# Patient Record
Sex: MALE | Race: BLACK OR AFRICAN AMERICAN | NOT HISPANIC OR LATINO | Employment: OTHER | URBAN - METROPOLITAN AREA
[De-identification: names, ages, dates, MRNs, and addresses within clinical notes are randomized per-mention and may not be internally consistent; named-entity substitution may affect disease eponyms.]

---

## 2017-08-31 LAB — CRC RECOMMENDATION EXT: NORMAL

## 2017-11-13 ENCOUNTER — HISTORICAL (OUTPATIENT)
Dept: LAB | Facility: HOSPITAL | Age: 57
End: 2017-11-13

## 2017-11-13 LAB
ABS NEUT (OLG): 3.62 X10(3)/MCL (ref 2.1–9.2)
ALBUMIN SERPL-MCNC: 3.9 GM/DL (ref 3.4–5)
ALBUMIN/GLOB SERPL: 1.3 RATIO (ref 1.1–2)
ALP SERPL-CCNC: 101 UNIT/L (ref 50–136)
ALT SERPL-CCNC: 28 UNIT/L (ref 12–78)
AST SERPL-CCNC: 18 UNIT/L (ref 15–37)
BASOPHILS # BLD AUTO: 0.1 X10(3)/MCL (ref 0–0.2)
BASOPHILS NFR BLD AUTO: 2 %
BILIRUB SERPL-MCNC: 0.5 MG/DL (ref 0.2–1)
BILIRUBIN DIRECT+TOT PNL SERPL-MCNC: 0.2 MG/DL (ref 0–0.5)
BILIRUBIN DIRECT+TOT PNL SERPL-MCNC: 0.3 MG/DL (ref 0–0.8)
BUN SERPL-MCNC: 11 MG/DL (ref 7–18)
CALCIUM SERPL-MCNC: 9.1 MG/DL (ref 8.5–10.1)
CHLORIDE SERPL-SCNC: 106 MMOL/L (ref 98–107)
CHOLEST SERPL-MCNC: 180 MG/DL (ref 0–200)
CHOLEST/HDLC SERPL: 2.7 {RATIO} (ref 0–5)
CO2 SERPL-SCNC: 24 MMOL/L (ref 21–32)
CREAT SERPL-MCNC: 1.05 MG/DL (ref 0.7–1.3)
DEPRECATED CALCIDIOL+CALCIFEROL SERPL-MC: 24.3 NG/ML (ref 30–80)
EOSINOPHIL # BLD AUTO: 0.1 X10(3)/MCL (ref 0–0.9)
EOSINOPHIL NFR BLD AUTO: 2 %
ERYTHROCYTE [DISTWIDTH] IN BLOOD BY AUTOMATED COUNT: 12.9 % (ref 11.5–17)
GLOBULIN SER-MCNC: 3 GM/DL (ref 2.4–3.5)
GLUCOSE SERPL-MCNC: 119 MG/DL (ref 74–106)
HCT VFR BLD AUTO: 39.1 % (ref 42–52)
HDLC SERPL-MCNC: 67 MG/DL (ref 35–60)
HGB BLD-MCNC: 13.7 GM/DL (ref 14–18)
LDLC SERPL CALC-MCNC: 97 MG/DL (ref 0–129)
LYMPHOCYTES # BLD AUTO: 1.7 X10(3)/MCL (ref 0.6–4.6)
LYMPHOCYTES NFR BLD AUTO: 28 %
MCH RBC QN AUTO: 31.5 PG (ref 27–31)
MCHC RBC AUTO-ENTMCNC: 35 GM/DL (ref 33–36)
MCV RBC AUTO: 89.9 FL (ref 80–94)
MONOCYTES # BLD AUTO: 0.4 X10(3)/MCL (ref 0.1–1.3)
MONOCYTES NFR BLD AUTO: 7 %
NEUTROPHILS # BLD AUTO: 3.62 X10(3)/MCL (ref 1.4–7.9)
NEUTROPHILS NFR BLD AUTO: 61 %
PLATELET # BLD AUTO: 188 X10(3)/MCL (ref 130–400)
PMV BLD AUTO: 10 FL (ref 9.4–12.4)
POTASSIUM SERPL-SCNC: 3.8 MMOL/L (ref 3.5–5.1)
PROT SERPL-MCNC: 6.9 GM/DL (ref 6.4–8.2)
PSA SERPL-MCNC: 0.69 NG/ML (ref 0–4)
RBC # BLD AUTO: 4.35 X10(6)/MCL (ref 4.7–6.1)
SODIUM SERPL-SCNC: 141 MMOL/L (ref 136–145)
TRIGL SERPL-MCNC: 80 MG/DL (ref 30–150)
TSH SERPL-ACNC: 0.39 MIU/ML (ref 0.36–3.74)
VLDLC SERPL CALC-MCNC: 16 MG/DL
WBC # SPEC AUTO: 5.9 X10(3)/MCL (ref 4.5–11.5)

## 2020-12-30 ENCOUNTER — HISTORICAL (OUTPATIENT)
Dept: LAB | Facility: HOSPITAL | Age: 60
End: 2020-12-30

## 2020-12-30 LAB
ABS NEUT (OLG): 3.06 X10(3)/MCL (ref 2.1–9.2)
ALBUMIN SERPL-MCNC: 4.2 GM/DL (ref 3.5–5)
ALBUMIN/GLOB SERPL: 1.4 RATIO (ref 1.1–2)
ALP SERPL-CCNC: 109 UNIT/L (ref 40–150)
ALT SERPL-CCNC: 17 UNIT/L (ref 0–55)
AST SERPL-CCNC: 18 UNIT/L (ref 5–34)
BASOPHILS # BLD AUTO: 0.08 X10(3)/MCL (ref 0–0.2)
BASOPHILS NFR BLD AUTO: 1.4 % (ref 0–0.9)
BILIRUB SERPL-MCNC: 0.5 MG/DL (ref 0.2–1.2)
BILIRUBIN DIRECT+TOT PNL SERPL-MCNC: 0.2 MG/DL (ref 0–0.5)
BILIRUBIN DIRECT+TOT PNL SERPL-MCNC: 0.3 MG/DL (ref 0–0.8)
BUN SERPL-MCNC: 14.1 MG/DL (ref 8.4–25.7)
CALCIUM SERPL-MCNC: 9.4 MG/DL (ref 8.4–10.2)
CHLORIDE SERPL-SCNC: 106 MMOL/L (ref 98–107)
CHOLEST SERPL-MCNC: 217 MG/DL
CHOLEST/HDLC SERPL: 4 {RATIO} (ref 0–5)
CO2 SERPL-SCNC: 26 MMOL/L (ref 22–29)
CREAT SERPL-MCNC: 1.07 MG/DL (ref 0.72–1.25)
DEPRECATED CALCIDIOL+CALCIFEROL SERPL-MC: 58.8 NG/ML (ref 30–80)
EOSINOPHIL # BLD AUTO: 0.08 X10(3)/MCL (ref 0–0.9)
EOSINOPHIL NFR BLD AUTO: 1.4 % (ref 0–6.5)
ERYTHROCYTE [DISTWIDTH] IN BLOOD BY AUTOMATED COUNT: 12.7 % (ref 11.5–17)
EST. AVERAGE GLUCOSE BLD GHB EST-MCNC: 114 MG/DL
FOLATE SERPL-MCNC: 11.8 NG/ML (ref 7–31.4)
GLOBULIN SER-MCNC: 2.9 GM/DL (ref 2.4–3.5)
GLUCOSE SERPL-MCNC: 88 MG/DL (ref 74–100)
HBA1C MFR BLD: 5.6 %
HCT VFR BLD AUTO: 41.1 % (ref 42–52)
HDLC SERPL-MCNC: 60 MG/DL (ref 40–60)
HGB BLD-MCNC: 14 GM/DL (ref 14–18)
IMM GRANULOCYTES # BLD AUTO: 0.01 10*3/UL (ref 0–0.02)
IMM GRANULOCYTES NFR BLD AUTO: 0.2 % (ref 0–0.43)
LDLC SERPL CALC-MCNC: 143 MG/DL (ref 50–140)
LYMPHOCYTES # BLD AUTO: 1.95 X10(3)/MCL (ref 0.6–4.6)
LYMPHOCYTES NFR BLD AUTO: 34.9 % (ref 16.2–38.3)
MCH RBC QN AUTO: 30.9 PG (ref 27–31)
MCHC RBC AUTO-ENTMCNC: 34.1 GM/DL (ref 33–36)
MCV RBC AUTO: 90.7 FL (ref 80–94)
MONOCYTES # BLD AUTO: 0.4 X10(3)/MCL (ref 0.1–1.3)
MONOCYTES NFR BLD AUTO: 7.2 % (ref 4.7–11.3)
NEUTROPHILS # BLD AUTO: 3.06 X10(3)/MCL (ref 2.1–9.2)
NEUTROPHILS NFR BLD AUTO: 54.9 % (ref 49.1–73.4)
NRBC BLD AUTO-RTO: 0 % (ref 0–0.2)
PLATELET # BLD AUTO: 200 X10(3)/MCL (ref 130–400)
PMV BLD AUTO: 9.3 FL (ref 7.4–10.4)
POTASSIUM SERPL-SCNC: 4.2 MMOL/L (ref 3.5–5.1)
PROT SERPL-MCNC: 7.1 GM/DL (ref 6.4–8.3)
PSA SERPL-MCNC: 0.69 NG/ML
RBC # BLD AUTO: 4.53 X10(6)/MCL (ref 4.7–6.1)
SODIUM SERPL-SCNC: 142 MMOL/L (ref 136–145)
TRIGL SERPL-MCNC: 68 MG/DL (ref 0–150)
TSH SERPL-ACNC: 0.63 UIU/ML (ref 0.35–4.94)
VIT B12 SERPL-MCNC: 361 PG/ML (ref 213–816)
VLDLC SERPL CALC-MCNC: 14 MG/DL
WBC # SPEC AUTO: 5.6 X10(3)/MCL (ref 4.5–11.5)

## 2022-04-10 ENCOUNTER — HISTORICAL (OUTPATIENT)
Dept: ADMINISTRATIVE | Facility: HOSPITAL | Age: 62
End: 2022-04-10

## 2022-04-27 VITALS
OXYGEN SATURATION: 99 % | DIASTOLIC BLOOD PRESSURE: 78 MMHG | SYSTOLIC BLOOD PRESSURE: 118 MMHG | HEIGHT: 72 IN | WEIGHT: 233.69 LBS | BODY MASS INDEX: 31.65 KG/M2

## 2022-09-25 PROBLEM — E66.9 OBESITY: Status: ACTIVE | Noted: 2022-09-25

## 2022-09-25 PROBLEM — E55.9 VITAMIN D DEFICIENCY: Status: ACTIVE | Noted: 2022-09-25

## 2022-09-25 PROBLEM — Z28.21 TETANUS, DIPHTHERIA, AND ACELLULAR PERTUSSIS (TDAP) VACCINATION DECLINED: Status: ACTIVE | Noted: 2022-09-25

## 2022-09-25 PROBLEM — Z83.3 FAMILY HISTORY OF DIABETES MELLITUS: Status: ACTIVE | Noted: 2022-09-25

## 2022-09-25 PROBLEM — Z00.00 WELLNESS EXAMINATION: Status: ACTIVE | Noted: 2022-09-25

## 2022-09-25 PROBLEM — Z12.11 COLON CANCER SCREENING: Status: ACTIVE | Noted: 2022-09-25

## 2022-09-25 NOTE — PROGRESS NOTES
Subjective:        Patient ID: Abraham Kwon is a 62 y.o. male.    Chief Complaint: Annual Exam (Wellness/Patient denies wanting flu shot/Patient needs labs ordered)      Patient presents to the clinic unaccompanied for a wellness visit.  He is due for lab work.  His last visit was in December of 2020.  He does not take any medications on a regular basis.      He has a history of low vitamin-D and was supplementing over-the-counter but has not been lately.    He is obese.    He has had a colonoscopy with Dr. Narvaez in 8/2017 which recommended a repeat in 10 years.    She there is a positive family history of diabetes in a sister and a paternal grandmother.    He is not allergic to any medications.  He does not drink alcohol or smoke.  He declines immunizations today.    Review of Systems   Constitutional: Negative.    HENT: Negative.     Eyes: Negative.    Respiratory: Negative.     Cardiovascular: Negative.    Gastrointestinal: Negative.    Endocrine: Negative.    Genitourinary: Negative.    Musculoskeletal: Negative.    Skin: Negative.    Allergic/Immunologic: Negative.    Neurological: Negative.    Hematological: Negative.    Psychiatric/Behavioral: Negative.     All other systems reviewed and are negative.      Review of patient's allergies indicates:  No Known Allergies   Vitals:    09/26/22 1008   BP: 124/84   Pulse: 71   Resp: 16   Temp: 98.1 °F (36.7 °C)   SpO2: 98%   Weight: 100.6 kg (221 lb 12.8 oz)      Social History     Socioeconomic History    Marital status: Single   Tobacco Use    Smoking status: Never    Smokeless tobacco: Never      History reviewed. No pertinent family history.       Objective:     Physical Exam  Vitals and nursing note reviewed.   Constitutional:       Appearance: Normal appearance. He is obese.   HENT:      Head: Normocephalic.      Nose: Nose normal.      Mouth/Throat:      Mouth: Mucous membranes are moist.      Pharynx: Oropharynx is clear.   Eyes:      Extraocular Movements:  Extraocular movements intact.   Cardiovascular:      Rate and Rhythm: Normal rate and regular rhythm.   Pulmonary:      Effort: Pulmonary effort is normal.      Breath sounds: Normal breath sounds.   Musculoskeletal:         General: Normal range of motion.   Skin:     General: Skin is warm and dry.   Neurological:      General: No focal deficit present.      Mental Status: He is alert and oriented to person, place, and time. Mental status is at baseline.   Psychiatric:         Mood and Affect: Mood normal.     No current outpatient medications on file prior to visit.     No current facility-administered medications on file prior to visit.     Health Maintenance   Topic Date Due    Hepatitis C Screening  Never done    TETANUS VACCINE  Never done    Lipid Panel  12/30/2025      Results for orders placed or performed in visit on 12/30/20   Comprehensive Metabolic Panel   Result Value Ref Range    Alanine Aminotransferase 17 0 - 55 unit/L    Albumin/Globulin Ratio 1.4 1.1 - 2.0 ratio    Alkaline Phosphatase 109 40 - 150 unit/L    Albumin Level 4.2 3.5 - 5.0 gm/dL    Aspartate Aminotransferase 18 5 - 34 unit/L    Blood Urea Nitrogen 14.1 8.4 - 25.7 mg/dL    Bilirubin Indirect 0.30 0.00 - 0.80 mg/dL    Bilirubin Direct 0.2 0.0 - 0.5 mg/dL    Bilirubin Total 0.5 0.2 - 1.2 mg/dL    Calcium Level Total 9.4 8.4 - 10.2 mg/dL    Chloride 106 98 - 107 mmol/L    Carbon Dioxide 26 22 - 29 mmol/L    Creatinine 1.07 0.72 - 1.25 mg/dL    Glucose Level 88 74 - 100 mg/dL    Globulin 2.9 2.4 - 3.5 gm/dL    Potassium Level 4.2 3.5 - 5.1 mmol/L    Sodium Level 142 136 - 145 mmol/L    Protein Total 7.1 6.4 - 8.3 gm/dL   Lipid Panel   Result Value Ref Range    Cholesterol/HDL Ratio 4 0 - 5    Cholesterol Total 217 (H) <<=200 mg/dL    HDL Cholesterol 60 40 - 60 mg/dL    LDL Cholesterol 143.00 (H) 50.00 - 140.00 mg/dL    Triglyceride 68 0 - 150 mg/dL    Very Low Density Lipoprotein 14    Hemoglobin A1C   Result Value Ref Range    Estimated  Average Glucose 114.0 mg/dL    Hemoglobin A1c 5.6 <<=7.0 %   Folate   Result Value Ref Range    Folate Level 11.8 7.0 - 31.4 ng/mL   PSA, Total (Diagnostic)   Result Value Ref Range    Prostate Specific Antigen 0.69 <<=4.00 ng/mL   TSH   Result Value Ref Range    Thyroid Stimulating Hormone 0.6311 0.3500 - 4.9400 uIU/mL   Vitamin D   Result Value Ref Range    Vit D 25 OH 58.8 30.0 - 80.0 ng/mL   Vitamin B12   Result Value Ref Range    Vitamin B12 Level 361 213 - 816 pg/mL   GFR, Estimated   Result Value Ref Range    Estimated GFR-Non African American >60 mL/min/1.73 m2    Estimated GFR- >60    Differential   Result Value Ref Range    Neut # 3.06 2.10 - 9.20 x10(3)/mcL    Lymph # 1.95 0.60 - 4.60 x10(3)/mcL    Mono # 0.40 0.10 - 1.30 x10(3)/mcL    Eos # 0.08 0.00 - 0.90 x10(3)/mcL    Baso # 0.08 0.00 - 0.20 x10(3)/mcL    Basophil % 1.4 (H) 0.0 - 0.9 %    Neut % 54.9 49.1 - 73.4 %    IG# 0.0100 0.0000 - 0.0155    Lymph % 34.9 16.2 - 38.3 %    IG% 0.200 0.000 - 0.430 %    Mono % 7.2 4.7 - 11.3 %    NRBC% 0.0 0.0 - 0.2 %    Eos % 1.4 0.0 - 6.5 %   CBC Auto Differential   Result Value Ref Range    Abs Neut 3.06 2.10 - 9.20 x10(3)/mcL    MCV 90.7 80.0 - 94.0 fL    MPV 9.3 7.4 - 10.4 fL    MCH 30.9 27.0 - 31.0 pg    MCHC 34.1 33.0 - 36.0 gm/dL    Hgb 14.0 14.0 - 18.0 gm/dL    Hct 41.1 (L) 42.0 - 52.0 %    WBC 5.6 4.5 - 11.5 x10(3)/mcL    RBC 4.53 (L) 4.70 - 6.10 x10(6)/mcL    RDW 12.7 11.5 - 17.0 %    Platelet 200 130 - 400 x10(3)/mcL          Assessment & Plan:     Active Problem List with Overview Notes    Diagnosis Date Noted    Prostate cancer screening 09/26/2022    Wellness examination 09/25/2022    Obesity 09/25/2022    Tetanus, diphtheria, and acellular pertussis (Tdap) vaccination declined 09/25/2022    Vitamin D deficiency 09/25/2022    Family history of diabetes mellitus 09/25/2022    Colon cancer screening 09/25/2022       1. Wellness examination  Assessment & Plan:  Fasting labs ordered. Will  call with results when available  psa ordered  Colonoscopy 8/2017 with dr. coleman  Declines immunizations      Orders:  -     CBC Auto Differential  -     Comprehensive Metabolic Panel  -     Hemoglobin A1C  -     Hepatitis C Antibody  -     HIV 1/2 Ag/Ab (4th Gen)  -     Vitamin D  -     TSH  -     PSA, Screening  -     Lipid Panel    2. Colon cancer screening  Assessment & Plan:  Colonoscopy with dr. coleman 8/2017    Orders:  -     CBC Auto Differential  -     Comprehensive Metabolic Panel  -     Hemoglobin A1C  -     Hepatitis C Antibody  -     HIV 1/2 Ag/Ab (4th Gen)  -     Vitamin D  -     TSH  -     PSA, Screening  -     Lipid Panel    3. Vitamin D deficiency  Assessment & Plan:  Not currently supplementing    Orders:  -     CBC Auto Differential  -     Comprehensive Metabolic Panel  -     Hemoglobin A1C  -     Hepatitis C Antibody  -     HIV 1/2 Ag/Ab (4th Gen)  -     Vitamin D  -     TSH  -     PSA, Screening  -     Lipid Panel    4. Obesity, unspecified classification, unspecified obesity type, unspecified whether serious comorbidity present  Assessment & Plan:  Encourage lifestyle change    Orders:  -     CBC Auto Differential  -     Comprehensive Metabolic Panel  -     Hemoglobin A1C  -     Hepatitis C Antibody  -     HIV 1/2 Ag/Ab (4th Gen)  -     Vitamin D  -     TSH  -     PSA, Screening  -     Lipid Panel    5. Family history of diabetes mellitus  Assessment & Plan:  Will add a1c to wellness labs.    Orders:  -     CBC Auto Differential  -     Comprehensive Metabolic Panel  -     Hemoglobin A1C  -     Hepatitis C Antibody  -     HIV 1/2 Ag/Ab (4th Gen)  -     Vitamin D  -     TSH  -     PSA, Screening  -     Lipid Panel    6. Prostate cancer screening  Assessment & Plan:  psa ordered    Orders:  -     CBC Auto Differential  -     Comprehensive Metabolic Panel  -     Hemoglobin A1C  -     Hepatitis C Antibody  -     HIV 1/2 Ag/Ab (4th Gen)  -     Vitamin D  -     TSH  -     PSA, Screening  -      Lipid Panel    7. Tetanus, diphtheria, and acellular pertussis (Tdap) vaccination declined  Assessment & Plan:  Declines immunizations         Follow up in about 1 year (around 9/26/2023) for wellness with labs.

## 2022-09-25 NOTE — ASSESSMENT & PLAN NOTE
Fasting labs ordered. Will call with results when available  psa ordered  Colonoscopy 8/2017 with dr. coleman  Declines immunizations

## 2022-09-26 ENCOUNTER — OFFICE VISIT (OUTPATIENT)
Dept: FAMILY MEDICINE | Facility: CLINIC | Age: 62
End: 2022-09-26
Payer: COMMERCIAL

## 2022-09-26 VITALS
WEIGHT: 221.81 LBS | TEMPERATURE: 98 F | BODY MASS INDEX: 29.72 KG/M2 | SYSTOLIC BLOOD PRESSURE: 124 MMHG | RESPIRATION RATE: 16 BRPM | OXYGEN SATURATION: 98 % | HEART RATE: 71 BPM | DIASTOLIC BLOOD PRESSURE: 84 MMHG

## 2022-09-26 DIAGNOSIS — Z28.21 TETANUS, DIPHTHERIA, AND ACELLULAR PERTUSSIS (TDAP) VACCINATION DECLINED: ICD-10-CM

## 2022-09-26 DIAGNOSIS — E55.9 VITAMIN D DEFICIENCY: ICD-10-CM

## 2022-09-26 DIAGNOSIS — Z83.3 FAMILY HISTORY OF DIABETES MELLITUS: ICD-10-CM

## 2022-09-26 DIAGNOSIS — E66.9 OBESITY, UNSPECIFIED CLASSIFICATION, UNSPECIFIED OBESITY TYPE, UNSPECIFIED WHETHER SERIOUS COMORBIDITY PRESENT: ICD-10-CM

## 2022-09-26 DIAGNOSIS — Z00.00 WELLNESS EXAMINATION: Primary | ICD-10-CM

## 2022-09-26 DIAGNOSIS — Z12.5 PROSTATE CANCER SCREENING: ICD-10-CM

## 2022-09-26 DIAGNOSIS — Z12.11 COLON CANCER SCREENING: ICD-10-CM

## 2022-09-26 PROCEDURE — 3079F DIAST BP 80-89 MM HG: CPT | Mod: CPTII,,, | Performed by: FAMILY MEDICINE

## 2022-09-26 PROCEDURE — 3008F BODY MASS INDEX DOCD: CPT | Mod: CPTII,,, | Performed by: FAMILY MEDICINE

## 2022-09-26 PROCEDURE — 3008F PR BODY MASS INDEX (BMI) DOCUMENTED: ICD-10-PCS | Mod: CPTII,,, | Performed by: FAMILY MEDICINE

## 2022-09-26 PROCEDURE — 1159F PR MEDICATION LIST DOCUMENTED IN MEDICAL RECORD: ICD-10-PCS | Mod: CPTII,,, | Performed by: FAMILY MEDICINE

## 2022-09-26 PROCEDURE — 1159F MED LIST DOCD IN RCRD: CPT | Mod: CPTII,,, | Performed by: FAMILY MEDICINE

## 2022-09-26 PROCEDURE — 3074F PR MOST RECENT SYSTOLIC BLOOD PRESSURE < 130 MM HG: ICD-10-PCS | Mod: CPTII,,, | Performed by: FAMILY MEDICINE

## 2022-09-26 PROCEDURE — 1160F RVW MEDS BY RX/DR IN RCRD: CPT | Mod: CPTII,,, | Performed by: FAMILY MEDICINE

## 2022-09-26 PROCEDURE — 99396 PR PREVENTIVE VISIT,EST,40-64: ICD-10-PCS | Mod: ,,, | Performed by: FAMILY MEDICINE

## 2022-09-26 PROCEDURE — 1160F PR REVIEW ALL MEDS BY PRESCRIBER/CLIN PHARMACIST DOCUMENTED: ICD-10-PCS | Mod: CPTII,,, | Performed by: FAMILY MEDICINE

## 2022-09-26 PROCEDURE — 3074F SYST BP LT 130 MM HG: CPT | Mod: CPTII,,, | Performed by: FAMILY MEDICINE

## 2022-09-26 PROCEDURE — 99396 PREV VISIT EST AGE 40-64: CPT | Mod: ,,, | Performed by: FAMILY MEDICINE

## 2022-09-26 PROCEDURE — 3079F PR MOST RECENT DIASTOLIC BLOOD PRESSURE 80-89 MM HG: ICD-10-PCS | Mod: CPTII,,, | Performed by: FAMILY MEDICINE

## 2022-09-30 ENCOUNTER — LAB VISIT (OUTPATIENT)
Dept: LAB | Facility: HOSPITAL | Age: 62
End: 2022-09-30
Attending: FAMILY MEDICINE
Payer: COMMERCIAL

## 2022-09-30 DIAGNOSIS — E55.9 VITAMIN D DEFICIENCY: ICD-10-CM

## 2022-09-30 DIAGNOSIS — E66.9 OBESITY, UNSPECIFIED CLASSIFICATION, UNSPECIFIED OBESITY TYPE, UNSPECIFIED WHETHER SERIOUS COMORBIDITY PRESENT: ICD-10-CM

## 2022-09-30 DIAGNOSIS — Z83.3 FAMILY HISTORY OF DIABETES MELLITUS: ICD-10-CM

## 2022-09-30 DIAGNOSIS — Z00.00 WELLNESS EXAMINATION: ICD-10-CM

## 2022-09-30 DIAGNOSIS — Z12.5 PROSTATE CANCER SCREENING: ICD-10-CM

## 2022-09-30 DIAGNOSIS — Z12.11 COLON CANCER SCREENING: ICD-10-CM

## 2022-09-30 LAB
ALBUMIN SERPL-MCNC: 3.7 GM/DL (ref 3.4–4.8)
ALBUMIN/GLOB SERPL: 1.1 RATIO (ref 1.1–2)
ALP SERPL-CCNC: 103 UNIT/L (ref 40–150)
ALT SERPL-CCNC: 16 UNIT/L (ref 0–55)
AST SERPL-CCNC: 18 UNIT/L (ref 5–34)
BASOPHILS # BLD AUTO: 0.1 X10(3)/MCL (ref 0–0.2)
BASOPHILS NFR BLD AUTO: 1.4 %
BILIRUBIN DIRECT+TOT PNL SERPL-MCNC: 0.6 MG/DL
BUN SERPL-MCNC: 7 MG/DL (ref 8.4–25.7)
CALCIUM SERPL-MCNC: 9.7 MG/DL (ref 8.8–10)
CHLORIDE SERPL-SCNC: 106 MMOL/L (ref 98–107)
CHOLEST SERPL-MCNC: 173 MG/DL
CHOLEST/HDLC SERPL: 3 {RATIO} (ref 0–5)
CO2 SERPL-SCNC: 27 MMOL/L (ref 23–31)
CREAT SERPL-MCNC: 0.91 MG/DL (ref 0.73–1.18)
DEPRECATED CALCIDIOL+CALCIFEROL SERPL-MC: 31.8 NG/ML (ref 30–80)
EOSINOPHIL # BLD AUTO: 0.15 X10(3)/MCL (ref 0–0.9)
EOSINOPHIL NFR BLD AUTO: 2 %
ERYTHROCYTE [DISTWIDTH] IN BLOOD BY AUTOMATED COUNT: 12.6 % (ref 11.5–17)
EST. AVERAGE GLUCOSE BLD GHB EST-MCNC: 108.3 MG/DL
GFR SERPLBLD CREATININE-BSD FMLA CKD-EPI: >60 MLS/MIN/1.73/M2
GLOBULIN SER-MCNC: 3.4 GM/DL (ref 2.4–3.5)
GLUCOSE SERPL-MCNC: 94 MG/DL (ref 82–115)
HBA1C MFR BLD: 5.4 %
HCT VFR BLD AUTO: 39.7 % (ref 42–52)
HCV AB SERPL QL IA: NONREACTIVE
HDLC SERPL-MCNC: 55 MG/DL (ref 35–60)
HGB BLD-MCNC: 13.6 GM/DL (ref 14–18)
HIV 1+2 AB+HIV1 P24 AG SERPL QL IA: NONREACTIVE
IMM GRANULOCYTES # BLD AUTO: 0.02 X10(3)/MCL (ref 0–0.04)
IMM GRANULOCYTES NFR BLD AUTO: 0.3 %
LDLC SERPL CALC-MCNC: 108 MG/DL (ref 50–140)
LYMPHOCYTES # BLD AUTO: 1.82 X10(3)/MCL (ref 0.6–4.6)
LYMPHOCYTES NFR BLD AUTO: 24.8 %
MCH RBC QN AUTO: 31.5 PG (ref 27–31)
MCHC RBC AUTO-ENTMCNC: 34.3 MG/DL (ref 33–36)
MCV RBC AUTO: 91.9 FL (ref 80–94)
MONOCYTES # BLD AUTO: 0.62 X10(3)/MCL (ref 0.1–1.3)
MONOCYTES NFR BLD AUTO: 8.4 %
NEUTROPHILS # BLD AUTO: 4.6 X10(3)/MCL (ref 2.1–9.2)
NEUTROPHILS NFR BLD AUTO: 63.1 %
NRBC BLD AUTO-RTO: 0 %
PLATELET # BLD AUTO: 222 X10(3)/MCL (ref 130–400)
PMV BLD AUTO: 9.2 FL (ref 7.4–10.4)
POTASSIUM SERPL-SCNC: 4.1 MMOL/L (ref 3.5–5.1)
PROT SERPL-MCNC: 7.1 GM/DL (ref 5.8–7.6)
PSA SERPL-MCNC: 0.77 NG/ML
RBC # BLD AUTO: 4.32 X10(6)/MCL (ref 4.7–6.1)
SODIUM SERPL-SCNC: 143 MMOL/L (ref 136–145)
TRIGL SERPL-MCNC: 48 MG/DL (ref 34–140)
TSH SERPL-ACNC: 0.81 UIU/ML (ref 0.35–4.94)
VLDLC SERPL CALC-MCNC: 10 MG/DL
WBC # SPEC AUTO: 7.4 X10(3)/MCL (ref 4.5–11.5)

## 2022-09-30 PROCEDURE — 84153 ASSAY OF PSA TOTAL: CPT

## 2022-09-30 PROCEDURE — 82306 VITAMIN D 25 HYDROXY: CPT

## 2022-09-30 PROCEDURE — 84443 ASSAY THYROID STIM HORMONE: CPT

## 2022-09-30 PROCEDURE — 85025 COMPLETE CBC W/AUTO DIFF WBC: CPT

## 2022-09-30 PROCEDURE — 36415 COLL VENOUS BLD VENIPUNCTURE: CPT

## 2022-09-30 PROCEDURE — 86803 HEPATITIS C AB TEST: CPT

## 2022-09-30 PROCEDURE — 83036 HEMOGLOBIN GLYCOSYLATED A1C: CPT

## 2022-09-30 PROCEDURE — 80061 LIPID PANEL: CPT

## 2022-09-30 PROCEDURE — 87389 HIV-1 AG W/HIV-1&-2 AB AG IA: CPT

## 2022-09-30 PROCEDURE — 80053 COMPREHEN METABOLIC PANEL: CPT

## 2022-09-30 NOTE — PROGRESS NOTES
Please inform patient of results.    1. He is anemic.  Is he seeing any blood in urine or stool? We may need to do more testing to figure out cause of anemia.   2. Vit d is low normal. Is he supplementing otc?    Other labwork within acceptable ranges. Continue on current meds.

## 2022-10-12 ENCOUNTER — DOCUMENTATION ONLY (OUTPATIENT)
Dept: ADMINISTRATIVE | Facility: HOSPITAL | Age: 62
End: 2022-10-12
Payer: COMMERCIAL

## 2022-10-19 ENCOUNTER — TELEPHONE (OUTPATIENT)
Dept: FAMILY MEDICINE | Facility: CLINIC | Age: 62
End: 2022-10-19
Payer: COMMERCIAL

## 2022-10-19 DIAGNOSIS — D64.9 ANEMIA, UNSPECIFIED TYPE: Primary | ICD-10-CM

## 2022-10-19 NOTE — TELEPHONE ENCOUNTER
----- Message from Dario Aceves sent at 10/19/2022  2:37 PM CDT -----  Regarding: call back  Type:  Patient Call Back    Who Called:Abraham  Would the patient rather a call back or a response via [facility]? Call back  Best Call Back Number:[phone]  Additional Information: Pt called and said he still hasn't heard anything about his results and what medications he should be taking. Pls call back pt thanks.

## 2022-10-19 NOTE — TELEPHONE ENCOUNTER
Had previously left voicemail for patient to return call.  I notified him of his lab results. He verbalized understanding.   Patient has not noticed any urine or blood in his stool, and stated he would be okay with doing more lab work to find out the cause of his anemia. Please advise on any orders you'd like to place. Thank you

## 2022-10-20 NOTE — TELEPHONE ENCOUNTER
I have signed for the following orders AND/OR meds.  Please call the patient and ask the patient to schedule the testing AND/OR inform about any medications that were sent.      Orders Placed This Encounter   Procedures    Ferritin     Standing Status:   Future     Standing Expiration Date:   12/19/2023    Folate     Standing Status:   Future     Standing Expiration Date:   12/19/2023    Iron and TIBC     Standing Status:   Future     Standing Expiration Date:   12/19/2023    Vitamin B12     Standing Status:   Future     Standing Expiration Date:   12/19/2023    OCCULT BLOOD FECAL IMMUNOASSAY     Standing Status:   Future     Number of Occurrences:   1     Standing Expiration Date:   12/19/2023    Urinalysis     Standing Status:   Future     Standing Expiration Date:   12/19/2023

## 2022-12-26 PROBLEM — Z00.00 WELLNESS EXAMINATION: Status: RESOLVED | Noted: 2022-09-25 | Resolved: 2022-12-26

## 2025-04-08 NOTE — PROGRESS NOTES
Subjective:        Patient ID: Abraham Kwon is a 65 y.o. male.    Chief Complaint: Annual Exam (Wellness /Swelling bilateral LE that started last year )      Patient presents to the clinic unaccompanied for a wellness visit.  He is due for lab work.  His last visit was 9/2022.  He does not take any medications on a regular basis.     Noted some LE swelling of bilateral since last year. Worse at end of day. No swelling in hands. No sob. No chest pain. Has been eating more salt.      He has low vitamin-D and was supplementing over-the-counter but has not been lately.     He is obese. Gained weight since lov.      He has had a colonoscopy with Dr. Narvaez in 8/2017 which recommended a repeat in 10 years.     She there is a positive family history of diabetes in a sister and a paternal grandmother.     He is not allergic to any medications.  He does not drink alcohol or smoke.  He declines immunizations today.        Review of Systems   Constitutional: Negative.    HENT: Negative.     Eyes: Negative.    Respiratory: Negative.  Negative for cough, shortness of breath and wheezing.    Cardiovascular:  Positive for leg swelling. Negative for chest pain and palpitations.   Gastrointestinal: Negative.    Endocrine: Negative.    Genitourinary: Negative.    Musculoskeletal: Negative.    Skin: Negative.    Allergic/Immunologic: Negative.    Neurological: Negative.    Hematological: Negative.    Psychiatric/Behavioral: Negative.     All other systems reviewed and are negative.        Review of patient's allergies indicates:  No Known Allergies   Vitals:    04/09/25 0948   BP: 124/84   BP Location: Left arm   Patient Position: Sitting   Pulse: 62   Resp: 16   Temp: 98.7 °F (37.1 °C)   TempSrc: Oral   SpO2: 98%   Weight: 104.8 kg (231 lb)   Height: 6' (1.829 m)      Social History     Socioeconomic History    Marital status: Single   Tobacco Use    Smoking status: Never    Smokeless tobacco: Never      No family history on file.        Objective:     Physical Exam  Vitals and nursing note reviewed.   Constitutional:       Appearance: Normal appearance. He is obese.   HENT:      Head: Normocephalic.      Nose: Nose normal.      Mouth/Throat:      Mouth: Mucous membranes are moist.      Pharynx: Oropharynx is clear.   Eyes:      Extraocular Movements: Extraocular movements intact.   Cardiovascular:      Rate and Rhythm: Normal rate and regular rhythm.   Pulmonary:      Effort: Pulmonary effort is normal. No respiratory distress.      Breath sounds: Normal breath sounds. No stridor. No wheezing, rhonchi or rales.   Chest:      Chest wall: No tenderness.   Musculoskeletal:         General: Normal range of motion.      Right lower leg: Swelling present.      Left lower leg: Swelling present.   Skin:     General: Skin is warm and dry.   Neurological:      General: No focal deficit present.      Mental Status: He is alert and oriented to person, place, and time. Mental status is at baseline.   Psychiatric:         Mood and Affect: Mood normal.       Medications Ordered Prior to Encounter[1]  Health Maintenance   Topic Date Due    COVID-19 Vaccine (2 - 2024-25 season) 09/01/2024    TETANUS VACCINE  04/09/2026 (Originally 2/20/1978)    Shingles Vaccine (1 of 2) 04/09/2026 (Originally 2/20/2011)    Pneumococcal Vaccines (Age 50+) (1 of 1 - PCV) 04/09/2026 (Originally 2/20/2011)    Hemoglobin A1c (Diabetic Prevention Screening)  09/30/2025    Colorectal Cancer Screening  08/31/2027    Lipid Panel  09/30/2027    RSV Vaccine (Age 60+ and Pregnant patients) (1 - 1-dose 75+ series) 02/20/2036    Hepatitis C Screening  Completed    HIV Screening  Completed    Influenza Vaccine  Addressed      Results for orders placed or performed in visit on 10/12/22    COLONOSCOPY    Collection Time: 08/31/17  3:09 PM   Result Value Ref Range    CRC Recommendation External Repeat colonoscopy in 10 years           Assessment & Plan:     Active Problem List with Overview  Notes    Diagnosis Date Noted    Localized swelling of both lower extremities 04/09/2025    Prostate cancer screening 09/26/2022    Wellness examination 09/25/2022    Class 1 obesity due to excess calories without serious comorbidity with body mass index (BMI) of 31.0 to 31.9 in adult 09/25/2022    Tetanus, diphtheria, and acellular pertussis (Tdap) vaccination declined 09/25/2022    Vitamin D deficiency 09/25/2022    Family history of diabetes mellitus 09/25/2022    Colon cancer screening 09/25/2022       1. Wellness examination  Assessment & Plan:  Fasting labs ordered. Will call with results when available  psa ordered  Prostate Specific Antigen (ng/mL)   Date Value   09/30/2022 0.77       Colonoscopy 8/2017 with dr. Narvaez with 10 year repeat  Declines immunizations      Orders:  -     CBC Auto Differential; Future; Expected date: 04/09/2025  -     Comprehensive Metabolic Panel; Future; Expected date: 04/09/2025  -     Lipid Panel; Future; Expected date: 04/09/2025  -     TSH; Future; Expected date: 04/09/2025  -     Hemoglobin A1C; Future; Expected date: 04/09/2025  -     Urinalysis; Future; Expected date: 04/09/2025  -     Vitamin D; Future; Expected date: 04/09/2025  -     PSA, Screening; Future; Expected date: 04/09/2025  -     BNP; Future; Expected date: 04/09/2025    2. Vitamin D deficiency  Assessment & Plan:  Not currently supplementing. Lab pending.     Orders:  -     CBC Auto Differential; Future; Expected date: 04/09/2025  -     Comprehensive Metabolic Panel; Future; Expected date: 04/09/2025  -     Lipid Panel; Future; Expected date: 04/09/2025  -     TSH; Future; Expected date: 04/09/2025  -     Hemoglobin A1C; Future; Expected date: 04/09/2025  -     Urinalysis; Future; Expected date: 04/09/2025  -     Vitamin D; Future; Expected date: 04/09/2025  -     PSA, Screening; Future; Expected date: 04/09/2025  -     BNP; Future; Expected date: 04/09/2025    3. Class 1 obesity due to excess calories  without serious comorbidity with body mass index (BMI) of 31.0 to 31.9 in adult  Assessment & Plan:  Gained weight since lov. Encourage lifestyle change. Discussed diet/exercise    Orders:  -     CBC Auto Differential; Future; Expected date: 04/09/2025  -     Comprehensive Metabolic Panel; Future; Expected date: 04/09/2025  -     Lipid Panel; Future; Expected date: 04/09/2025  -     TSH; Future; Expected date: 04/09/2025  -     Hemoglobin A1C; Future; Expected date: 04/09/2025  -     Urinalysis; Future; Expected date: 04/09/2025  -     Vitamin D; Future; Expected date: 04/09/2025  -     PSA, Screening; Future; Expected date: 04/09/2025  -     BNP; Future; Expected date: 04/09/2025    4. Family history of diabetes mellitus  Assessment & Plan:  Will add a1c to wellness labs.    Lab Results   Component Value Date    HGBA1C 5.4 09/30/2022         Orders:  -     CBC Auto Differential; Future; Expected date: 04/09/2025  -     Comprehensive Metabolic Panel; Future; Expected date: 04/09/2025  -     Lipid Panel; Future; Expected date: 04/09/2025  -     TSH; Future; Expected date: 04/09/2025  -     Hemoglobin A1C; Future; Expected date: 04/09/2025  -     Urinalysis; Future; Expected date: 04/09/2025  -     Vitamin D; Future; Expected date: 04/09/2025  -     PSA, Screening; Future; Expected date: 04/09/2025  -     BNP; Future; Expected date: 04/09/2025    5. Colon cancer screening  Assessment & Plan:  Colonoscopy with dr. coleman 8/2017 with 10 year repeat    Orders:  -     CBC Auto Differential; Future; Expected date: 04/09/2025  -     Comprehensive Metabolic Panel; Future; Expected date: 04/09/2025  -     Lipid Panel; Future; Expected date: 04/09/2025  -     TSH; Future; Expected date: 04/09/2025  -     Hemoglobin A1C; Future; Expected date: 04/09/2025  -     Urinalysis; Future; Expected date: 04/09/2025  -     Vitamin D; Future; Expected date: 04/09/2025  -     PSA, Screening; Future; Expected date: 04/09/2025  -     BNP;  Future; Expected date: 04/09/2025    6. Prostate cancer screening  -     CBC Auto Differential; Future; Expected date: 04/09/2025  -     Comprehensive Metabolic Panel; Future; Expected date: 04/09/2025  -     Lipid Panel; Future; Expected date: 04/09/2025  -     TSH; Future; Expected date: 04/09/2025  -     Hemoglobin A1C; Future; Expected date: 04/09/2025  -     Urinalysis; Future; Expected date: 04/09/2025  -     Vitamin D; Future; Expected date: 04/09/2025  -     PSA, Screening; Future; Expected date: 04/09/2025  -     BNP; Future; Expected date: 04/09/2025    7. Localized swelling of both lower extremities  Assessment & Plan:  Likely dependent edema. Labs pending.  Will call with results when available. Avoid salt. Elevate legs when sitting. Wear compression socks. Monitor symptoms. Contact clinic for concerns. Patient is agreeable to plan and verbalized understanding    Orders:  -     CBC Auto Differential; Future; Expected date: 04/09/2025  -     Comprehensive Metabolic Panel; Future; Expected date: 04/09/2025  -     Lipid Panel; Future; Expected date: 04/09/2025  -     TSH; Future; Expected date: 04/09/2025  -     Hemoglobin A1C; Future; Expected date: 04/09/2025  -     Urinalysis; Future; Expected date: 04/09/2025  -     Vitamin D; Future; Expected date: 04/09/2025  -     PSA, Screening; Future; Expected date: 04/09/2025  -     BNP; Future; Expected date: 04/09/2025         Follow up in about 1 year (around 4/9/2026) for Wellness with Labs.          [1]   No current outpatient medications on file prior to visit.     No current facility-administered medications on file prior to visit.

## 2025-04-09 ENCOUNTER — OFFICE VISIT (OUTPATIENT)
Dept: FAMILY MEDICINE | Facility: CLINIC | Age: 65
End: 2025-04-09
Payer: MEDICAID

## 2025-04-09 VITALS
TEMPERATURE: 99 F | HEIGHT: 72 IN | RESPIRATION RATE: 16 BRPM | HEART RATE: 62 BPM | WEIGHT: 231 LBS | SYSTOLIC BLOOD PRESSURE: 124 MMHG | BODY MASS INDEX: 31.29 KG/M2 | OXYGEN SATURATION: 98 % | DIASTOLIC BLOOD PRESSURE: 84 MMHG

## 2025-04-09 DIAGNOSIS — M79.89 LOCALIZED SWELLING OF BOTH LOWER EXTREMITIES: ICD-10-CM

## 2025-04-09 DIAGNOSIS — Z00.00 WELLNESS EXAMINATION: Primary | ICD-10-CM

## 2025-04-09 DIAGNOSIS — Z12.11 COLON CANCER SCREENING: ICD-10-CM

## 2025-04-09 DIAGNOSIS — Z12.5 PROSTATE CANCER SCREENING: ICD-10-CM

## 2025-04-09 DIAGNOSIS — E66.09 CLASS 1 OBESITY DUE TO EXCESS CALORIES WITHOUT SERIOUS COMORBIDITY WITH BODY MASS INDEX (BMI) OF 31.0 TO 31.9 IN ADULT: ICD-10-CM

## 2025-04-09 DIAGNOSIS — E55.9 VITAMIN D DEFICIENCY: ICD-10-CM

## 2025-04-09 DIAGNOSIS — E66.811 CLASS 1 OBESITY DUE TO EXCESS CALORIES WITHOUT SERIOUS COMORBIDITY WITH BODY MASS INDEX (BMI) OF 31.0 TO 31.9 IN ADULT: ICD-10-CM

## 2025-04-09 DIAGNOSIS — Z83.3 FAMILY HISTORY OF DIABETES MELLITUS: ICD-10-CM

## 2025-04-09 PROCEDURE — 1159F MED LIST DOCD IN RCRD: CPT | Mod: CPTII,,, | Performed by: FAMILY MEDICINE

## 2025-04-09 PROCEDURE — 3074F SYST BP LT 130 MM HG: CPT | Mod: CPTII,,, | Performed by: FAMILY MEDICINE

## 2025-04-09 PROCEDURE — 99396 PREV VISIT EST AGE 40-64: CPT | Mod: ,,, | Performed by: FAMILY MEDICINE

## 2025-04-09 PROCEDURE — 3008F BODY MASS INDEX DOCD: CPT | Mod: CPTII,,, | Performed by: FAMILY MEDICINE

## 2025-04-09 PROCEDURE — 3079F DIAST BP 80-89 MM HG: CPT | Mod: CPTII,,, | Performed by: FAMILY MEDICINE

## 2025-04-09 PROCEDURE — 1160F RVW MEDS BY RX/DR IN RCRD: CPT | Mod: CPTII,,, | Performed by: FAMILY MEDICINE

## 2025-04-09 NOTE — ASSESSMENT & PLAN NOTE
Will add a1c to wellness labs.    Lab Results   Component Value Date    HGBA1C 5.4 09/30/2022

## 2025-04-09 NOTE — ASSESSMENT & PLAN NOTE
Fasting labs ordered. Will call with results when available  psa ordered  Prostate Specific Antigen (ng/mL)   Date Value   09/30/2022 0.77       Colonoscopy 8/2017 with dr. Narvaez with 10 year repeat  Declines immunizations

## 2025-04-09 NOTE — ASSESSMENT & PLAN NOTE
Likely dependent edema. Labs pending.  Will call with results when available. Avoid salt. Elevate legs when sitting. Wear compression socks. Monitor symptoms. Contact clinic for concerns. Patient is agreeable to plan and verbalized understanding

## 2025-04-10 ENCOUNTER — RESULTS FOLLOW-UP (OUTPATIENT)
Dept: FAMILY MEDICINE | Facility: CLINIC | Age: 65
End: 2025-04-10

## 2025-04-10 ENCOUNTER — LAB VISIT (OUTPATIENT)
Dept: LAB | Facility: HOSPITAL | Age: 65
End: 2025-04-10
Attending: FAMILY MEDICINE
Payer: MEDICAID

## 2025-04-10 DIAGNOSIS — E55.9 VITAMIN D DEFICIENCY: ICD-10-CM

## 2025-04-10 DIAGNOSIS — E66.811 CLASS 1 OBESITY DUE TO EXCESS CALORIES WITHOUT SERIOUS COMORBIDITY WITH BODY MASS INDEX (BMI) OF 31.0 TO 31.9 IN ADULT: ICD-10-CM

## 2025-04-10 DIAGNOSIS — M79.89 LOCALIZED SWELLING OF BOTH LOWER EXTREMITIES: ICD-10-CM

## 2025-04-10 DIAGNOSIS — E66.09 CLASS 1 OBESITY DUE TO EXCESS CALORIES WITHOUT SERIOUS COMORBIDITY WITH BODY MASS INDEX (BMI) OF 31.0 TO 31.9 IN ADULT: ICD-10-CM

## 2025-04-10 DIAGNOSIS — Z12.5 PROSTATE CANCER SCREENING: ICD-10-CM

## 2025-04-10 DIAGNOSIS — Z83.3 FAMILY HISTORY OF DIABETES MELLITUS: ICD-10-CM

## 2025-04-10 DIAGNOSIS — Z00.00 WELLNESS EXAMINATION: ICD-10-CM

## 2025-04-10 DIAGNOSIS — Z12.11 COLON CANCER SCREENING: ICD-10-CM

## 2025-04-10 LAB
25(OH)D3+25(OH)D2 SERPL-MCNC: 29 NG/ML (ref 30–80)
ALBUMIN SERPL-MCNC: 4 G/DL (ref 3.4–4.8)
ALBUMIN/GLOB SERPL: 1.4 RATIO (ref 1.1–2)
ALP SERPL-CCNC: 87 UNIT/L (ref 40–150)
ALT SERPL-CCNC: 14 UNIT/L (ref 0–55)
ANION GAP SERPL CALC-SCNC: 6 MEQ/L
AST SERPL-CCNC: 18 UNIT/L (ref 11–45)
BACTERIA #/AREA URNS AUTO: NORMAL /HPF
BASOPHILS # BLD AUTO: 0.07 X10(3)/MCL
BASOPHILS NFR BLD AUTO: 1.3 %
BILIRUB SERPL-MCNC: 0.6 MG/DL
BILIRUB UR QL STRIP.AUTO: NEGATIVE
BNP BLD-MCNC: 12.2 PG/ML
BUN SERPL-MCNC: 12.6 MG/DL (ref 8.4–25.7)
CALCIUM SERPL-MCNC: 9.1 MG/DL (ref 8.8–10)
CHLORIDE SERPL-SCNC: 115 MMOL/L (ref 98–107)
CHOLEST SERPL-MCNC: 193 MG/DL
CHOLEST/HDLC SERPL: 3 {RATIO} (ref 0–5)
CLARITY UR: CLEAR
CO2 SERPL-SCNC: 24 MMOL/L (ref 23–31)
COLOR UR AUTO: ABNORMAL
CREAT SERPL-MCNC: 1.07 MG/DL (ref 0.72–1.25)
CREAT/UREA NIT SERPL: 12
EOSINOPHIL # BLD AUTO: 0.05 X10(3)/MCL (ref 0–0.9)
EOSINOPHIL NFR BLD AUTO: 1 %
ERYTHROCYTE [DISTWIDTH] IN BLOOD BY AUTOMATED COUNT: 12.7 % (ref 11.5–17)
EST. AVERAGE GLUCOSE BLD GHB EST-MCNC: 114 MG/DL
GFR SERPLBLD CREATININE-BSD FMLA CKD-EPI: >60 ML/MIN/1.73/M2
GLOBULIN SER-MCNC: 2.8 GM/DL (ref 2.4–3.5)
GLUCOSE SERPL-MCNC: 93 MG/DL (ref 82–115)
GLUCOSE UR QL STRIP: NEGATIVE
HBA1C MFR BLD: 5.6 %
HCT VFR BLD AUTO: 38.8 % (ref 42–52)
HDLC SERPL-MCNC: 58 MG/DL (ref 35–60)
HGB BLD-MCNC: 13.6 G/DL (ref 14–18)
HGB UR QL STRIP: ABNORMAL
IMM GRANULOCYTES # BLD AUTO: 0.01 X10(3)/MCL (ref 0–0.04)
IMM GRANULOCYTES NFR BLD AUTO: 0.2 %
KETONES UR QL STRIP: NEGATIVE
LDLC SERPL CALC-MCNC: 126 MG/DL (ref 50–140)
LEUKOCYTE ESTERASE UR QL STRIP: NEGATIVE
LYMPHOCYTES # BLD AUTO: 1.62 X10(3)/MCL (ref 0.6–4.6)
LYMPHOCYTES NFR BLD AUTO: 31 %
MCH RBC QN AUTO: 31.4 PG (ref 27–31)
MCHC RBC AUTO-ENTMCNC: 35.1 G/DL (ref 33–36)
MCV RBC AUTO: 89.6 FL (ref 80–94)
MONOCYTES # BLD AUTO: 0.38 X10(3)/MCL (ref 0.1–1.3)
MONOCYTES NFR BLD AUTO: 7.3 %
NEUTROPHILS # BLD AUTO: 3.1 X10(3)/MCL (ref 2.1–9.2)
NEUTROPHILS NFR BLD AUTO: 59.2 %
NITRITE UR QL STRIP: NEGATIVE
NRBC BLD AUTO-RTO: 0 %
PH UR STRIP: 6 [PH]
PLATELET # BLD AUTO: 194 X10(3)/MCL (ref 130–400)
PMV BLD AUTO: 9.3 FL (ref 7.4–10.4)
POTASSIUM SERPL-SCNC: 4.5 MMOL/L (ref 3.5–5.1)
PROT SERPL-MCNC: 6.8 GM/DL (ref 5.8–7.6)
PROT UR QL STRIP: NEGATIVE
PSA SERPL-MCNC: 0.99 NG/ML
RBC # BLD AUTO: 4.33 X10(6)/MCL (ref 4.7–6.1)
RBC #/AREA URNS AUTO: NORMAL /HPF
SODIUM SERPL-SCNC: 145 MMOL/L (ref 136–145)
SP GR UR STRIP.AUTO: 1.02 (ref 1–1.03)
SQUAMOUS #/AREA URNS AUTO: NORMAL /HPF
TRIGL SERPL-MCNC: 44 MG/DL (ref 34–140)
TSH SERPL-ACNC: 0.51 UIU/ML (ref 0.35–4.94)
UROBILINOGEN UR STRIP-ACNC: 0.2
VLDLC SERPL CALC-MCNC: 9 MG/DL
WBC # BLD AUTO: 5.23 X10(3)/MCL (ref 4.5–11.5)
WBC #/AREA URNS AUTO: NORMAL /HPF

## 2025-04-10 PROCEDURE — 80053 COMPREHEN METABOLIC PANEL: CPT

## 2025-04-10 PROCEDURE — 84153 ASSAY OF PSA TOTAL: CPT

## 2025-04-10 PROCEDURE — 84443 ASSAY THYROID STIM HORMONE: CPT

## 2025-04-10 PROCEDURE — 80061 LIPID PANEL: CPT

## 2025-04-10 PROCEDURE — 85025 COMPLETE CBC W/AUTO DIFF WBC: CPT

## 2025-04-10 PROCEDURE — 83880 ASSAY OF NATRIURETIC PEPTIDE: CPT

## 2025-04-10 PROCEDURE — 36415 COLL VENOUS BLD VENIPUNCTURE: CPT

## 2025-04-10 PROCEDURE — 83036 HEMOGLOBIN GLYCOSYLATED A1C: CPT

## 2025-04-10 PROCEDURE — 82306 VITAMIN D 25 HYDROXY: CPT

## 2025-04-10 NOTE — PROGRESS NOTES
Please inform patient of results.    1. Ua shows trace blood but none seen under microscope. Encourage fluids. monitor

## 2025-04-11 NOTE — PROGRESS NOTES
Please inform patient of results.    1. Vit d is low. Supplement with vitamin d3 1000 iu otc daily if not already doing so  2. Chloride is slightly elevated. Other electrolytes wnl. Encourage fluids. Monitor.   3. He is anemic but stable. Chronic.  Consider checking iron studies/b12/folate levels.   4. Bnp is normal. Heart failure is not cause of his LE swelling. Watch his salt. Elevate his legs when sitting. Get compression socks. Monitor symptoms. Contact clinic for concerns.   5. A1c is 5.6- up from previous but not dmii. Watch diet/exercise. Monitor.   6.psa and thyroid are wnl    Other labwork within acceptable ranges.

## 2025-04-15 ENCOUNTER — TELEPHONE (OUTPATIENT)
Dept: FAMILY MEDICINE | Facility: CLINIC | Age: 65
End: 2025-04-15
Payer: MEDICAID

## 2025-04-15 NOTE — TELEPHONE ENCOUNTER
----- Message from Francia sent at 4/15/2025  4:11 PM CDT -----  Who Called: Abraham Sifuentes is returning phone callWho Left Message for Patient:Ct Almanza the patient know what this is regarding?:test resultsPreferred Method of Contact: Phone CallPatient's Preferred Phone Number on File: [phone] Best Call Back Number, if different:Additional Information:

## 2025-08-26 ENCOUNTER — PATIENT MESSAGE (OUTPATIENT)
Dept: FAMILY MEDICINE | Facility: CLINIC | Age: 65
End: 2025-08-26
Payer: MEDICAID

## 2025-09-02 ENCOUNTER — OFFICE VISIT (OUTPATIENT)
Dept: FAMILY MEDICINE | Facility: CLINIC | Age: 65
End: 2025-09-02
Payer: MEDICAID

## 2025-09-02 VITALS
BODY MASS INDEX: 31.46 KG/M2 | SYSTOLIC BLOOD PRESSURE: 132 MMHG | OXYGEN SATURATION: 98 % | WEIGHT: 232.31 LBS | RESPIRATION RATE: 20 BRPM | DIASTOLIC BLOOD PRESSURE: 78 MMHG | HEIGHT: 72 IN | HEART RATE: 65 BPM | TEMPERATURE: 98 F

## 2025-09-02 DIAGNOSIS — R60.0 PERIPHERAL EDEMA: Primary | ICD-10-CM

## 2025-09-02 PROCEDURE — 3078F DIAST BP <80 MM HG: CPT | Mod: CPTII,,, | Performed by: NURSE PRACTITIONER

## 2025-09-02 PROCEDURE — 99213 OFFICE O/P EST LOW 20 MIN: CPT | Mod: ,,, | Performed by: NURSE PRACTITIONER

## 2025-09-02 PROCEDURE — 1159F MED LIST DOCD IN RCRD: CPT | Mod: CPTII,,, | Performed by: NURSE PRACTITIONER

## 2025-09-02 PROCEDURE — 1160F RVW MEDS BY RX/DR IN RCRD: CPT | Mod: CPTII,,, | Performed by: NURSE PRACTITIONER

## 2025-09-02 PROCEDURE — 3075F SYST BP GE 130 - 139MM HG: CPT | Mod: CPTII,,, | Performed by: NURSE PRACTITIONER

## 2025-09-02 PROCEDURE — 3008F BODY MASS INDEX DOCD: CPT | Mod: CPTII,,, | Performed by: NURSE PRACTITIONER

## 2025-09-02 PROCEDURE — 3044F HG A1C LEVEL LT 7.0%: CPT | Mod: CPTII,,, | Performed by: NURSE PRACTITIONER
